# Patient Record
Sex: FEMALE | Race: BLACK OR AFRICAN AMERICAN | URBAN - METROPOLITAN AREA
[De-identification: names, ages, dates, MRNs, and addresses within clinical notes are randomized per-mention and may not be internally consistent; named-entity substitution may affect disease eponyms.]

---

## 2024-08-13 ENCOUNTER — EMPLOYEE HEALTH (OUTPATIENT)
Dept: OTHER | Facility: HOSPITAL | Age: 29
End: 2024-08-13
Attending: PREVENTIVE MEDICINE

## 2024-08-13 DIAGNOSIS — Z00.00 WELLNESS EXAMINATION: Primary | ICD-10-CM

## 2024-08-13 LAB
RUBV IGG SER QL: POSITIVE
RUBV IGG SER-ACNC: 52 IU/ML (ref 10–?)

## 2024-08-13 PROCEDURE — 86787 VARICELLA-ZOSTER ANTIBODY: CPT

## 2024-08-13 PROCEDURE — 86765 RUBEOLA ANTIBODY: CPT

## 2024-08-13 PROCEDURE — 86735 MUMPS ANTIBODY: CPT

## 2024-08-13 PROCEDURE — 86480 TB TEST CELL IMMUN MEASURE: CPT

## 2024-08-13 PROCEDURE — 86762 RUBELLA ANTIBODY: CPT

## 2024-08-14 LAB
MEV IGG SER-ACNC: >300 AU/ML (ref 16.5–?)
MUV IGG SER IA-ACNC: 86.1 AU/ML (ref 11–?)
VZV IGG SER IA-ACNC: 1999 (ref 165–?)

## 2024-08-17 LAB
M TB IFN-G CD4+ T-CELLS BLD-ACNC: 0.28 IU/ML
M TB TUBERC IFN-G BLD QL: NEGATIVE
M TB TUBERC IGNF/MITOGEN IGNF CONTROL: >10 IU/ML
QFT TB1 AG MINUS NIL: -0.12 IU/ML
QFT TB2 AG MINUS NIL: -0.14 IU/ML

## 2024-08-26 ENCOUNTER — TELEPHONE (OUTPATIENT)
Dept: INTERNAL MEDICINE CLINIC | Facility: HOSPITAL | Age: 29
End: 2024-08-26

## 2024-08-26 DIAGNOSIS — Z20.822 SUSPECTED COVID-19 VIRUS INFECTION: Primary | ICD-10-CM

## 2024-08-26 NOTE — TELEPHONE ENCOUNTER
[x] EH  []AMARILYS   [] [facility]  Manager : Susannah Cadena    [x] Direct Patient Care  []Indirect Patient Contact   [] Non-Clinical/No Patient Contact    For Direct Patient Care ONLY: Have you been fitted with an N95 mask within the last 12 months? [x] Yes  []No      High Risk Area:    [] Yes   [x] No    HAVE YOU RECEIVED THE COVID-19 Vaccine? Yes [x]    No []          If yes, date(s) received:    4/7/22        Which vaccine:  Pfizer [x]     Moderna []    J&J []      SYMPTOMS (reported via dashboard):  [] asymptomatic  [x] symptomatic  [] GI symptoms only    Symptom onset date: 8/25  Fever   > 100F             Yes []      Cough                          Yes [x]      Shortness of breath  Yes []      Congestion                 Yes [x]      Runny nose                Yes [x]        Loss of Smell              Yes []        Loss of Taste             Yes []       Sore throat                 Yes [x]       Fatigue                        Yes []       Body Aches                Yes []        Chills                           Yes []        Headache                   Yes []             GI symptoms             Yes []     No [x]                     Nausea   []          Vomiting            []                                    Diarrhea  []          Upset stomach []      Employee reported COVID Exposure?  Yes []     No [x]    Date of exposure:   []  Coworker                       [] patient                        [] Family/friend    PPE:   [] N95 Mask/PAPR  [] Standard Mask  [] Eyewear  [] None    Within 6 feet for >15 minutes? [] Yes []  No    Is this a true exposure? []  Yes []  No    When was the last shift you worked?: 8/23    Employee has a history of Covid?  Yes []     No [x]   If Yes, when:    Employee is immunocompromised?  Yes []     No [x]      PLAN:     COVID-19 testing ordered:   [x] Rapid      [] Alinity              Date test is to be taken:   8/26    []  No testing required at this time  []  Outside testing                            Notes:    INSTRUCTIONS PROVIDED:    [x]  Employee was instructed to call Central scheduling at [phone] or use [employer] to make an appointment for their testing   [x]  May return to work if employee views negative result in MyChart and remains fever, vomiting, and diarrhea free  []  May continue to work if remains asymptomatic and views negative result in MyChart  []  Follow up for condition update when resulting  []  If symptoms develop, stay home and call hotline for rapid test order  []  If COVID positive results, off work minimum of 5 days from positive test or onset of symptoms (day 0)     [x]  Plan noted above  [x]  Length of time to obtain results  []  Quarantine instructions  []  S/S of worsening infection/condition and importance of prompt medical re-evaluation including when to seek emergency care.   [x] The employee voiced understanding

## 2024-08-28 ENCOUNTER — LAB ENCOUNTER (OUTPATIENT)
Dept: LAB | Facility: HOSPITAL | Age: 29
End: 2024-08-28
Attending: PREVENTIVE MEDICINE
Payer: MEDICAID

## 2024-08-28 DIAGNOSIS — Z20.822 SUSPECTED COVID-19 VIRUS INFECTION: ICD-10-CM

## 2024-08-28 LAB — SARS-COV-2 RNA RESP QL NAA+PROBE: NOT DETECTED

## 2024-08-28 NOTE — TELEPHONE ENCOUNTER
Results and RTW guidelines:    COVID RESULT:    [x] Viewed by employee in [employer].  RTW plan and instructions as indicated on triage call.  Manager notified.  Estimated RTW date: 08/28/2024  [] Discussed with employee   [] Unable to reach by phone.  Sent via [employer] message      Test type:    [x] Rapid         [] Alinity         [] Outside test:       [x] NEGATIVE     Ordered Alinity retest?  []Yes   [x] No (skip to RTW)   Ordered Rapid retest?   []Yes   [x] No (skip to RTW)           Dated to be taken:      If Yes, PLACE ORDER NOW and instruct the following:  -Originally Symptomatic or Now Symptoms:   -RTW when sx improve- fever free for 24 hours w/o medications, Diarrhea/Vomiting for 24 hours w/o medications    -Originally  Asymptomatic  -Asymptomatic AND Vaccinated or Unvaccinated or Prior infection in past 90 days:   -May work and continue to monitor symptoms for the next 10 days.                                         -Alinity test day 2, Alinity test day 5 (day 0 - exposure)